# Patient Record
Sex: FEMALE | Race: BLACK OR AFRICAN AMERICAN
[De-identification: names, ages, dates, MRNs, and addresses within clinical notes are randomized per-mention and may not be internally consistent; named-entity substitution may affect disease eponyms.]

---

## 2017-05-05 ENCOUNTER — HOSPITAL ENCOUNTER (OUTPATIENT)
Dept: HOSPITAL 47 - RADUSMAIN | Age: 27
Discharge: HOME | End: 2017-05-05
Payer: COMMERCIAL

## 2017-05-05 DIAGNOSIS — N83.201: Primary | ICD-10-CM

## 2017-05-05 PROCEDURE — 76856 US EXAM PELVIC COMPLETE: CPT

## 2017-05-06 NOTE — US
EXAMINATION TYPE: US pelvic complete

 

DATE OF EXAM: 5/5/2017 6:12 PM

 

COMPARISON: NONE

 

CLINICAL HISTORY: N92.0 Menorrhagia.

 

TECHNIQUE:  Transabdominal (TA)

 

Date of LMP:  4/26/17

 

EXAM MEASUREMENTS:

 

Uterus:  7.2 x 3.1 x 4.8 cm

Endometrial Stripe: 0.6 cm

Right Ovary:  2.3 x 1.6 x 1.4 cm

Left Ovary:  4.5 x 3.2 x 3.2 cm

 

 

 

1. Uterus:  Anteverted   wnl

2. Endometrium:  wnl

3. Right Ovary:  wnl

4. Left Ovary:  simple appearing cyst measuring 3.9 x 2.7 x 2.9cm

5. Bilateral Adnexa:  wnl

6. Posterior cul-de-sac:  wnl

 

 

 

IMPRESSION: Left ovarian cyst, consider follow-up

## 2020-07-14 ENCOUNTER — HOSPITAL ENCOUNTER (EMERGENCY)
Dept: HOSPITAL 47 - EC | Age: 30
Discharge: HOME | End: 2020-07-14
Payer: COMMERCIAL

## 2020-07-14 VITALS — DIASTOLIC BLOOD PRESSURE: 72 MMHG | SYSTOLIC BLOOD PRESSURE: 124 MMHG | HEART RATE: 81 BPM

## 2020-07-14 VITALS — RESPIRATION RATE: 16 BRPM | TEMPERATURE: 98 F

## 2020-07-14 DIAGNOSIS — O99.89: Primary | ICD-10-CM

## 2020-07-14 DIAGNOSIS — Z3A.01: ICD-10-CM

## 2020-07-14 DIAGNOSIS — R10.30: ICD-10-CM

## 2020-07-14 DIAGNOSIS — Z53.29: ICD-10-CM

## 2020-07-14 LAB
ALBUMIN SERPL-MCNC: 3.7 G/DL (ref 3.5–5)
ALP SERPL-CCNC: 67 U/L (ref 38–126)
ALT SERPL-CCNC: 10 U/L (ref 4–34)
ANION GAP SERPL CALC-SCNC: 6 MMOL/L
AST SERPL-CCNC: 20 U/L (ref 14–36)
BASOPHILS # BLD AUTO: 0 K/UL (ref 0–0.2)
BASOPHILS NFR BLD AUTO: 0 %
BUN SERPL-SCNC: 11 MG/DL (ref 7–17)
CALCIUM SPEC-MCNC: 9.2 MG/DL (ref 8.4–10.2)
CHLORIDE SERPL-SCNC: 105 MMOL/L (ref 98–107)
CO2 SERPL-SCNC: 24 MMOL/L (ref 22–30)
EOSINOPHIL # BLD AUTO: 0.1 K/UL (ref 0–0.7)
EOSINOPHIL NFR BLD AUTO: 2 %
ERYTHROCYTE [DISTWIDTH] IN BLOOD BY AUTOMATED COUNT: 3.44 M/UL (ref 3.8–5.4)
ERYTHROCYTE [DISTWIDTH] IN BLOOD: 12.7 % (ref 11.5–15.5)
GLUCOSE SERPL-MCNC: 74 MG/DL (ref 74–99)
HCG SERPL-MCNC: (no result) MIU/ML
HCT VFR BLD AUTO: 33.7 % (ref 34–46)
HGB BLD-MCNC: 11.6 GM/DL (ref 11.4–16)
LYMPHOCYTES # SPEC AUTO: 1.3 K/UL (ref 1–4.8)
LYMPHOCYTES NFR SPEC AUTO: 23 %
MCH RBC QN AUTO: 33.6 PG (ref 25–35)
MCHC RBC AUTO-ENTMCNC: 34.2 G/DL (ref 31–37)
MCV RBC AUTO: 98.1 FL (ref 80–100)
MONOCYTES # BLD AUTO: 0.3 K/UL (ref 0–1)
MONOCYTES NFR BLD AUTO: 4 %
NEUTROPHILS # BLD AUTO: 3.9 K/UL (ref 1.3–7.7)
NEUTROPHILS NFR BLD AUTO: 69 %
PH UR: 7.5 [PH] (ref 5–8)
PLATELET # BLD AUTO: 198 K/UL (ref 150–450)
POTASSIUM SERPL-SCNC: 3.9 MMOL/L (ref 3.5–5.1)
PROT SERPL-MCNC: 6.4 G/DL (ref 6.3–8.2)
RBC UR QL: 3 /HPF (ref 0–5)
SODIUM SERPL-SCNC: 135 MMOL/L (ref 137–145)
SP GR UR: 1.03 (ref 1–1.03)
SQUAMOUS UR QL AUTO: <1 /HPF (ref 0–4)
UROBILINOGEN UR QL STRIP: 4 MG/DL (ref ?–2)
WBC # BLD AUTO: 5.7 K/UL (ref 3.8–10.6)
WBC # UR AUTO: 14 /HPF (ref 0–5)

## 2020-07-14 PROCEDURE — 80053 COMPREHEN METABOLIC PANEL: CPT

## 2020-07-14 PROCEDURE — 76801 OB US < 14 WKS SINGLE FETUS: CPT

## 2020-07-14 PROCEDURE — 76817 TRANSVAGINAL US OBSTETRIC: CPT

## 2020-07-14 PROCEDURE — 87086 URINE CULTURE/COLONY COUNT: CPT

## 2020-07-14 PROCEDURE — 84702 CHORIONIC GONADOTROPIN TEST: CPT

## 2020-07-14 PROCEDURE — 81001 URINALYSIS AUTO W/SCOPE: CPT

## 2020-07-14 PROCEDURE — 85025 COMPLETE CBC W/AUTO DIFF WBC: CPT

## 2020-07-14 PROCEDURE — 81025 URINE PREGNANCY TEST: CPT

## 2020-07-14 PROCEDURE — 99284 EMERGENCY DEPT VISIT MOD MDM: CPT

## 2020-07-14 PROCEDURE — 36415 COLL VENOUS BLD VENIPUNCTURE: CPT

## 2020-07-14 NOTE — US
EXAMINATION TYPE: Transabdominal

 

DATE OF EXAM: 2020 9:15 PM

 

COMPARISON: US 2017

 

CLINICAL HISTORY: preg, lower abdominal discomfort. Pregnant, lower abdominal discomfort. LMP unknown
. .

 

EXAM PERFORMED:  Transvaginal (TV) and Transabdominal (TA)

 

EXAM MEASUREMENTS:

 

GESTATIONAL AGE / DATING

 

Physician Established: Not yet established 

Dates by LMP:  Unknown 

Dates by First Scan:  This is first scan 

Dates by Current Scan for: (5 weeks/3 days)  

** EDC: 2020. Gestational sac and yolk sac seen at this time.

 

MATERNAL ANATOMY 

 

Uterus: 7.9 x 6.5 x 4.5 cm.

Right Ovary: 4.1 x 2.0 x 2.9 cm. Measures enlarged. Anechoic area seen: 1.6 x 1.2 x 1.5 cm. Additiona
l mixed area seen as mentioned below. 

Left Ovary: not seen

Post CDS / Adnexa: Fluid seen in CDS

Presence of free fluid: Fluid seen in CDS

Presence of corpus luteal cyst: Area of mixed echogenicity and peripheral vascularity seen within rig
ht ovary: 2.0 x 1.6 x 1.8 cm. 

Presence of subchorionic bleed: not seen

 

GESTATION / FETAL SURVEY

 

 

MSD: 1.24 cm. (5 weeks/3 days)

Yolk Sac (normal less than 6mm): 1.9 mm

IUP:  Gestational sac and yolk sac seen at this time.

 

Date of LMP: unknown

Beta HcG (if available):  detected

 

IMPRESSION:

 

1. Single intrauterine gestation estimated at 5 weeks 3 days gestation based on the mean sac diameter
. Fetal pole is not identified. Yolk sac is present. No cardiac activity at this time. Follow-up is r
ecommended.

## 2020-07-14 NOTE — ED
Abdominal Pain HPI





- General


Chief Complaint: Abdominal Pain


Stated Complaint: Abd Pain


Time Seen by Provider: 20 19:39


Source: patient


Mode of arrival: ambulatory


Limitations: no limitations





- History of Present Illness


Initial Comments: 


29-year-old  A1 presenting to the emergency department today for chief 

complaint of lower abdominal cramping.  Patient states that she had positive 

pregnancy test one week ago.  She states her last period was in the beginning of

 inches unsure of the exact date.  Patient denies any vaginal bleeding va

ginal discharge dysuria urgency frequency.  She states she developed cramping 

yesterday.  Patient states that she follows Dr. Merino for OBGYN care. Patient 

states that she was concerned that something was wrong with the pregnancy and 

presented to the ER for evaluation. patient has no additional complaints.








- Related Data


                                Home Medications











 Medication  Instructions  Recorded  Confirmed


 


Hydrocortisone Oint 1 applic TOPICAL BID PRN 20





[Hydrocortisone 2.5% Oint]   











                                    Allergies











Allergy/AdvReac Type Severity Reaction Status Date / Time


 


No Known Allergies Allergy   Verified 20 20:42














Review of Systems


ROS Statement: 


Those systems with pertinent positive or pertinent negative responses have been 

documented in the HPI.





ROS Other: All systems not noted in ROS Statement are negative.





Past Medical History


Past Medical History: No Reported History


History of Any Multi-Drug Resistant Organisms: None Reported


Past Surgical History: No Surgical Hx Reported


Past Anesthesia/Blood Transfusion Reactions: No Reported Reaction


Past Psychological History: No Psychological Hx Reported


Smoking Status: Never smoker


Past Alcohol Use History: None Reported


Past Drug Use History: None Reported





- Past Family History


  ** Mother


Family Medical History: Hypertension





General Exam





- General Exam Comments


Initial Comments: 


General:  The patient is awake and alert, in no distress


Eye:  +3 mm pupils are equal, round and reactive to light, extra-ocular 

movements are intact.  No nystagmus.  There is normal conjunctiva bilaterally.  

No signs of icterus.  


Cardiovascular:  There is a regular rate and rhythm. No murmur, rub or gallop is

appreciated.


Respiratory:  Lungs are clear to auscultation, respirations are non-labored, 

breath sounds are equal.  No wheezes, stridor, rales, or rhonchi.


Gastrointestinal:  Soft, non-distended, non-tender abdomen without masses or 

organomegaly noted. There is no rebound or guarding present.  


Musculoskeletal:  Normal ROM, no tenderness.  Strength 5/5. Sensation intact. 

Radial pulses equal bilaterally 2+.  


Neurological:  A&O x 3. CN II-XII intact grossly, There are no obvious motor or 

sensory deficits. Coordination appears grossly intact. Speech is normal.


Skin:  Skin is warm and dry and no rashes or lesions are noted. 


Psychiatric:  Cooperative, appropriate mood & affect, normal judgment.  





Limitations: no limitations





Course


                                   Vital Signs











  20





  19:33 22:27


 


Temperature 98.0 F 


 


Pulse Rate 62 81


 


Respiratory 16 16





Rate  


 


Blood Pressure 103/71 124/72


 


O2 Sat by Pulse 100 99





Oximetry  














Medical Decision Making





- Medical Decision Making


29F feel presented for chief complaint of lower abdominal cramping.  In 

pregnancy.  Last period last month.  There is IUP on US measuring 5wks 3 days. 

Patient denies vaginal bleeding. Refused vaginal examination. Patient is 

agreeable to care plan and discharge. I recommended f/u in office with Dr. Merino. Discussed case wtih Dr. Kahn.








- Lab Data


Result diagrams: 


                                 20 20:13





                                 20 20:13


                                   Lab Results











  20 Range/Units





  19:44 19:53 20:13 


 


WBC     (3.8-10.6)  k/uL


 


RBC     (3.80-5.40)  m/uL


 


Hgb     (11.4-16.0)  gm/dL


 


Hct     (34.0-46.0)  %


 


MCV     (80.0-100.0)  fL


 


MCH     (25.0-35.0)  pg


 


MCHC     (31.0-37.0)  g/dL


 


RDW     (11.5-15.5)  %


 


Plt Count     (150-450)  k/uL


 


Neutrophils %     %


 


Lymphocytes %     %


 


Monocytes %     %


 


Eosinophils %     %


 


Basophils %     %


 


Neutrophils #     (1.3-7.7)  k/uL


 


Lymphocytes #     (1.0-4.8)  k/uL


 


Monocytes #     (0-1.0)  k/uL


 


Eosinophils #     (0-0.7)  k/uL


 


Basophils #     (0-0.2)  k/uL


 


Sodium    135 L  (137-145)  mmol/L


 


Potassium    3.9  (3.5-5.1)  mmol/L


 


Chloride    105  ()  mmol/L


 


Carbon Dioxide    24  (22-30)  mmol/L


 


Anion Gap    6  mmol/L


 


BUN    11  (7-17)  mg/dL


 


Creatinine    0.55  (0.52-1.04)  mg/dL


 


Est GFR (CKD-EPI)AfAm    >90  (>60 ml/min/1.73 sqM)  


 


Est GFR (CKD-EPI)NonAf    >90  (>60 ml/min/1.73 sqM)  


 


Glucose    74  (74-99)  mg/dL


 


Calcium    9.2  (8.4-10.2)  mg/dL


 


Total Bilirubin    0.3  (0.2-1.3)  mg/dL


 


AST    20  (14-36)  U/L


 


ALT    10  (4-34)  U/L


 


Alkaline Phosphatase    67  ()  U/L


 


Total Protein    6.4  (6.3-8.2)  g/dL


 


Albumin    3.7  (3.5-5.0)  g/dL


 


HCG, Quant    79114.8  mIU/mL


 


Urine Color  Yellow    


 


Urine Appearance  Clear    (Clear)  


 


Urine pH  7.5    (5.0-8.0)  


 


Ur Specific Gravity  1.026    (1.001-1.035)  


 


Urine Protein  Negative    (Negative)  


 


Urine Glucose (UA)  Negative    (Negative)  


 


Urine Ketones  Negative    (Negative)  


 


Urine Blood  Negative    (Negative)  


 


Urine Nitrite  Negative    (Negative)  


 


Urine Bilirubin  Negative    (Negative)  


 


Urine Urobilinogen  4.0    (<2.0)  mg/dL


 


Ur Leukocyte Esterase  Small H    (Negative)  


 


Urine RBC  3    (0-5)  /hpf


 


Urine WBC  14 H    (0-5)  /hpf


 


Ur Squamous Epith Cells  <1    (0-4)  /hpf


 


Urine Bacteria  Rare H    (None)  /hpf


 


Urine Mucus  Few H    (None)  /hpf


 


Urine HCG, Qual   Detected   (Not Detectd)  














  20 Range/Units





  20:13 


 


WBC  5.7  (3.8-10.6)  k/uL


 


RBC  3.44 L  (3.80-5.40)  m/uL


 


Hgb  11.6  (11.4-16.0)  gm/dL


 


Hct  33.7 L  (34.0-46.0)  %


 


MCV  98.1  (80.0-100.0)  fL


 


MCH  33.6  (25.0-35.0)  pg


 


MCHC  34.2  (31.0-37.0)  g/dL


 


RDW  12.7  (11.5-15.5)  %


 


Plt Count  198  (150-450)  k/uL


 


Neutrophils %  69  %


 


Lymphocytes %  23  %


 


Monocytes %  4  %


 


Eosinophils %  2  %


 


Basophils %  0  %


 


Neutrophils #  3.9  (1.3-7.7)  k/uL


 


Lymphocytes #  1.3  (1.0-4.8)  k/uL


 


Monocytes #  0.3  (0-1.0)  k/uL


 


Eosinophils #  0.1  (0-0.7)  k/uL


 


Basophils #  0.0  (0-0.2)  k/uL


 


Sodium   (137-145)  mmol/L


 


Potassium   (3.5-5.1)  mmol/L


 


Chloride   ()  mmol/L


 


Carbon Dioxide   (22-30)  mmol/L


 


Anion Gap   mmol/L


 


BUN   (7-17)  mg/dL


 


Creatinine   (0.52-1.04)  mg/dL


 


Est GFR (CKD-EPI)AfAm   (>60 ml/min/1.73 sqM)  


 


Est GFR (CKD-EPI)NonAf   (>60 ml/min/1.73 sqM)  


 


Glucose   (74-99)  mg/dL


 


Calcium   (8.4-10.2)  mg/dL


 


Total Bilirubin   (0.2-1.3)  mg/dL


 


AST   (14-36)  U/L


 


ALT   (4-34)  U/L


 


Alkaline Phosphatase   ()  U/L


 


Total Protein   (6.3-8.2)  g/dL


 


Albumin   (3.5-5.0)  g/dL


 


HCG, Quant   mIU/mL


 


Urine Color   


 


Urine Appearance   (Clear)  


 


Urine pH   (5.0-8.0)  


 


Ur Specific Gravity   (1.001-1.035)  


 


Urine Protein   (Negative)  


 


Urine Glucose (UA)   (Negative)  


 


Urine Ketones   (Negative)  


 


Urine Blood   (Negative)  


 


Urine Nitrite   (Negative)  


 


Urine Bilirubin   (Negative)  


 


Urine Urobilinogen   (<2.0)  mg/dL


 


Ur Leukocyte Esterase   (Negative)  


 


Urine RBC   (0-5)  /hpf


 


Urine WBC   (0-5)  /hpf


 


Ur Squamous Epith Cells   (0-4)  /hpf


 


Urine Bacteria   (None)  /hpf


 


Urine Mucus   (None)  /hpf


 


Urine HCG, Qual   (Not Detectd)  














Disposition


Clinical Impression: 


 Early stage of pregnancy, Intrauterine pregnancy





Disposition: HOME SELF-CARE


Condition: Good


Instructions (If sedation given, give patient instructions):  Abdominal Pain in 

Pregnancy (ED)


Additional Instructions: 


Please use medication as discussed.  Please follow-up with Dr. Sandhu in next 

week.  Please return to emergency room if the symptoms increase or worsen or for

any other concerns.


Is patient prescribed a controlled substance at d/c from ED?: No


Referrals: 


Virginia Hammond MD [Primary Care Provider] - 1-2 days


Yun Sandhu DO [Doctor of Osteopathic Medicine] - 1-2 days


Time of Disposition: 22:13

## 2020-08-10 ENCOUNTER — HOSPITAL ENCOUNTER (OUTPATIENT)
Dept: HOSPITAL 47 - RADUSWWP | Age: 30
Discharge: HOME | End: 2020-08-10
Attending: OBSTETRICS & GYNECOLOGY
Payer: MEDICARE

## 2020-08-10 DIAGNOSIS — Z3A.09: ICD-10-CM

## 2020-08-10 DIAGNOSIS — Z36.9: Primary | ICD-10-CM

## 2020-08-10 PROCEDURE — 76801 OB US < 14 WKS SINGLE FETUS: CPT

## 2020-08-10 NOTE — US
EXAMINATION TYPE: Transabdominal

 

DATE OF EXAM: 8/10/2020 9:00 AM

 

COMPARISON: Ultrasound 2020

 

CLINICAL HISTORY: Z36 CONFIRM DATES. Dates

 

EXAM PERFORMED:  Transabdominal (TA)

 

EXAM MEASUREMENTS:

 

GESTATIONAL AGE / DATING

 

Dates by LMP: (9 weeks/2 days)  

** EDC: 3/13/2020

Dates by First Scan:  Unable to date by first ultrasound 

Dates by Current Scan for: (9 weeks/3 days)  

** EDC: 3/12/2020

 

MATERNAL ANATOMY 

 

Uterus: 10.0 x 7.4 x 5.6 cm 

Right Ovary: 4.1 x 2.1 x 2.6 cm

Left Ovary: 3.8 x 1.8 x 1.7 cm

Post CDS / Adnexa: no free fluid

Presence of free fluid: no 

Presence of corpus luteal cyst: right hypoechoic lesion with peripheral vascular flow = 1.9 x 2.2 x 1
.8 cm 

Presence of subchorionic bleed: no

 

GESTATION / FETAL SURVEY

 

CRL: 2.7 cm (9 weeks/3 days)

MSD: seen, not measured 

Yolk Sac (normal less than 6mm): 2.8 mm

Heart Rate: 171 bpm

Rhythm:  Normal

IUP:  Viable IUP

 

 

Date of LMP: 2020, 

Beta HcG (if available): Not available at this time

 

Single live IUP measuring 9 weeks 3 days

 

Single live intrauterine gestation is now identified. The gestational sac, yolk sac, now fetal pole a
re seen on today's study. Fetal heart tones regular and within normal limits. No free fluid in pelvic
 cul-de-sac.

 

Both ovaries redemonstrated. Persistent corpus luteal cyst suspected in the right ovary. No concernin
g ovarian adnexal masses.

 

IMPRESSION: There is now confirmation of single live intrauterine gestation with satisfactory interva
l progression from prior ultrasound, mean crown-rump length is 2.7 cm corresponding to a 9 week 3 day
 old fetus.

## 2021-02-28 ENCOUNTER — HOSPITAL ENCOUNTER (OUTPATIENT)
Age: 31
LOS: 1 days | Discharge: HOME | End: 2021-03-01
Payer: MEDICARE

## 2021-02-28 PROCEDURE — 84112 EVAL AMNIOTIC FLUID PROTEIN: CPT

## 2021-02-28 PROCEDURE — 99213 OFFICE O/P EST LOW 20 MIN: CPT

## 2021-02-28 PROCEDURE — 59025 FETAL NON-STRESS TEST: CPT

## 2021-03-01 NOTE — P.MSEPDOC
Presenting Problems





- Arrival Data


Date of Arrival on Unit: 21


Time of Arrival on Unit: 23:30


Mode of Transport: Wheelchair





- Complaint


OB-Reason for Admission/Chief Complaint: Possible Onset of Labor, Rule Out SROM


Comment: Pt presents to triage with c/o contractions starting around 1900 and 

possible leaking of fluid which started at 2100. Pt states she is not sure if 

water broke or not. Contractions tracing every 2.5-5 minutes. Amnisure performed

which was negative.





Prenatal Medical History





- Pregnancy Information


: 3


Para: 1


Term: 1


: 0


Abortions: Spontaneous or Elective: 1


Number of Living Children: 1





- Gestational Age


Gestational Age by NIKUNJ (wks/days): 38 Weeks and 3 Days





- Prenatal History


Pregnancy Complications: Other


Comment: Pt positive for trichomonas and treated with Flagyl at 15 weeks.





Review of Systems





- Review of Systems


Constitutional: No problems


Breast: No problems


ENT: No problems


Cardiovascular: No problems


Respiratory: No problems


Gastrointestinal: No problems


Genitourinary: No problems


Musculoskeletal: No problems


Neurological: No problems


Skin: No problems





Vital Signs





- Temperature


Temperature: 96.6 F


Temperature Source: Temporal Artery Scan





- Pulse


  ** Apical


Pulse Rate: 76


Pulse Assessment Method: Automatic Cuff





- Respirations


Respiratory Rate: 18


Oxygen Delivery Method: Room Air


O2 Sat by Pulse Oximetry: 99





- Blood Pressure


  ** Right Arm


Blood Pressure: 126/80


Blood Pressure Mean: 95


Blood Pressure Source: Automatic Cuff





Medical Screen Scoring (Pre)





- Cervical Exam


Dilation: 4-7 cm = 2


Effacement: More than 50% = 2


Membranes: Intact





- Uterine Contractions


Frequency: > or = 36 weeks =2


Duration: > 40 seconds = 2


Intensity: N/A





- Maternal Vital Signs


Maternal Temperature: N/A


Maternal Blood Pressure: N/A


Signs of Preeclampsia: N/A


Maternal Respirations: N/A





- Maternal Trauma


Maternal Trauma: N/A





- Fetal Assessment - Baby A


Baseline FHR: 135


Fetal Heart Rate - NICHD Category: Category I (Normal) = 0


NST: Reactive


Fetal Position: N/A


Fetal Station: N/A





- Total Score - Baby A


Total Score - Baby A: 8





- Total Score - Baby B


Total Score - Baby B: 8





- Total Score - Baby C


Total Score - Baby C: 8





- Level of Risk - Baby A


Level of Risk - Baby A: Medium (6-9)





- Level of Risk - Baby B


Level of Risk - Baby B: Medium (6-9)





- Level of Risk - Baby C


Level of Risk - Baby C: Medium (6-9)





Physician Notification (Pre)





- Physician Notified


Physician Notified Date: 21


Physician Notified Time: 01:09


New Order Received: Yes





- Notification Comment


Comment: Dr. Merino called re: pt c/o contractions and "leaking fluid", maternal

status, pain scale 9/10, fetal status, negative amnisure result, SVE and no 

cervical change within the hour.  Orders received to d/c pt home. Pt to keep 

scheduled appt with Dr. Sandhu tomorrow.





Disposition





- Disposition


OB Disposition: Discharge to home


Discharge Date: 21


Discharge Time: 01:18


I agree with the RN Medical Screening Exam: Yes


Case reviewed; plan agreed upon as documented in EMR&OBIX.: Yes


Diagnosis: FALSE LABOR BEFORE 37 COMPLETED WEEKS OF GEST, THIRD TRI

## 2021-03-03 ENCOUNTER — HOSPITAL ENCOUNTER (INPATIENT)
Dept: HOSPITAL 47 - FBPOP | Age: 31
LOS: 1 days | Discharge: HOME | End: 2021-03-04
Attending: OBSTETRICS & GYNECOLOGY | Admitting: OBSTETRICS & GYNECOLOGY
Payer: MEDICARE

## 2021-03-03 VITALS — RESPIRATION RATE: 16 BRPM

## 2021-03-03 DIAGNOSIS — Z3A.38: ICD-10-CM

## 2021-03-03 DIAGNOSIS — Z86.19: ICD-10-CM

## 2021-03-03 DIAGNOSIS — Z82.49: ICD-10-CM

## 2021-03-03 DIAGNOSIS — Z87.891: ICD-10-CM

## 2021-03-03 LAB
ALT SERPL-CCNC: 8 U/L (ref 4–34)
AST SERPL-CCNC: 21 U/L (ref 14–36)
BASOPHILS # BLD AUTO: 0 K/UL (ref 0–0.2)
BASOPHILS NFR BLD AUTO: 0 %
EOSINOPHIL # BLD AUTO: 0 K/UL (ref 0–0.7)
EOSINOPHIL NFR BLD AUTO: 1 %
ERYTHROCYTE [DISTWIDTH] IN BLOOD BY AUTOMATED COUNT: 3.27 M/UL (ref 3.8–5.4)
ERYTHROCYTE [DISTWIDTH] IN BLOOD: 13.6 % (ref 11.5–15.5)
HCT VFR BLD AUTO: 30 % (ref 34–46)
HGB BLD-MCNC: 10 GM/DL (ref 11.4–16)
LDH SPEC-CCNC: 422 U/L (ref 313–618)
LYMPHOCYTES # SPEC AUTO: 1.2 K/UL (ref 1–4.8)
LYMPHOCYTES NFR SPEC AUTO: 23 %
MCH RBC QN AUTO: 30.7 PG (ref 25–35)
MCHC RBC AUTO-ENTMCNC: 33.5 G/DL (ref 31–37)
MCV RBC AUTO: 91.7 FL (ref 80–100)
MONOCYTES # BLD AUTO: 0.3 K/UL (ref 0–1)
MONOCYTES NFR BLD AUTO: 5 %
NEUTROPHILS # BLD AUTO: 3.7 K/UL (ref 1.3–7.7)
NEUTROPHILS NFR BLD AUTO: 68 %
PH UR: 7 [PH] (ref 5–8)
PLATELET # BLD AUTO: 175 K/UL (ref 150–450)
SP GR UR: 1.02 (ref 1–1.03)
URATE SERPL-MCNC: 3.2 MG/DL (ref 3.7–7.4)
UROBILINOGEN UR QL STRIP: <2 MG/DL (ref ?–2)
WBC # BLD AUTO: 5.4 K/UL (ref 3.8–10.6)

## 2021-03-03 PROCEDURE — 83615 LACTATE (LD) (LDH) ENZYME: CPT

## 2021-03-03 PROCEDURE — 3E0R3BZ INTRODUCTION OF ANESTHETIC AGENT INTO SPINAL CANAL, PERCUTANEOUS APPROACH: ICD-10-PCS

## 2021-03-03 PROCEDURE — 85025 COMPLETE CBC W/AUTO DIFF WBC: CPT

## 2021-03-03 PROCEDURE — 86901 BLOOD TYPING SEROLOGIC RH(D): CPT

## 2021-03-03 PROCEDURE — 0KQM0ZZ REPAIR PERINEUM MUSCLE, OPEN APPROACH: ICD-10-PCS

## 2021-03-03 PROCEDURE — 84550 ASSAY OF BLOOD/URIC ACID: CPT

## 2021-03-03 PROCEDURE — 82565 ASSAY OF CREATININE: CPT

## 2021-03-03 PROCEDURE — 81003 URINALYSIS AUTO W/O SCOPE: CPT

## 2021-03-03 PROCEDURE — 00HU33Z INSERTION OF INFUSION DEVICE INTO SPINAL CANAL, PERCUTANEOUS APPROACH: ICD-10-PCS

## 2021-03-03 PROCEDURE — 84520 ASSAY OF UREA NITROGEN: CPT

## 2021-03-03 PROCEDURE — 84112 EVAL AMNIOTIC FLUID PROTEIN: CPT

## 2021-03-03 PROCEDURE — 86850 RBC ANTIBODY SCREEN: CPT

## 2021-03-03 PROCEDURE — 86900 BLOOD TYPING SEROLOGIC ABO: CPT

## 2021-03-03 PROCEDURE — 84450 TRANSFERASE (AST) (SGOT): CPT

## 2021-03-03 PROCEDURE — 59025 FETAL NON-STRESS TEST: CPT

## 2021-03-03 PROCEDURE — 99213 OFFICE O/P EST LOW 20 MIN: CPT

## 2021-03-03 PROCEDURE — 84460 ALANINE AMINO (ALT) (SGPT): CPT

## 2021-03-03 RX ADMIN — IBUPROFEN PRN MG: 600 TABLET ORAL at 14:00

## 2021-03-03 RX ADMIN — IBUPROFEN PRN MG: 600 TABLET ORAL at 21:23

## 2021-03-03 RX ADMIN — POTASSIUM CHLORIDE SCH MLS/HR: 14.9 INJECTION, SOLUTION INTRAVENOUS at 01:38

## 2021-03-03 RX ADMIN — POTASSIUM CHLORIDE SCH MLS/HR: 14.9 INJECTION, SOLUTION INTRAVENOUS at 08:51

## 2021-03-03 RX ADMIN — AMPICILLIN SODIUM SCH MLS/HR: 1 INJECTION, POWDER, FOR SOLUTION INTRAMUSCULAR; INTRAVENOUS at 09:44

## 2021-03-03 RX ADMIN — ACETAMINOPHEN PRN MG: 325 TABLET, FILM COATED ORAL at 15:30

## 2021-03-03 RX ADMIN — AMPICILLIN SODIUM SCH MLS/HR: 1 INJECTION, POWDER, FOR SOLUTION INTRAMUSCULAR; INTRAVENOUS at 05:46

## 2021-03-03 RX ADMIN — DOCUSATE SODIUM AND SENNOSIDES SCH: 50; 8.6 TABLET ORAL at 21:24

## 2021-03-03 NOTE — P.PROBDLV
Vaginal Delivery Note





- .


Vaginal Delivery Note: 





The patient progressed to complete dilation after oxytocin augmentation of labor

and artificial rupture of membranes of a 4 bag with clear fluid noted.  She did 

receive epidural anesthesia.  Once reaching complete, she began pushing.  After 

approximately 50 minutes, the infant's head came to a crown.  She was noted to 

have variable decelerations down to the 60s with good return to baseline after 

her contractions.  With one further push, the infant's head delivered across the

perineum and a right occiput posterior lie followed by the anterior shoulder.  A

tight nuchal cord 2 was doubly clamped and cut and reduced around the infant's.

 With one remaining push the remainder the infant easily delivered and was 

placed on mother's abdomen.  Infant was immediately taken to warmer for 

evaluation by nursing staff.  A viable female infant was noted with Apgar scores

of 2 at 1 minute 6 at 5 minutes and 7 at 10 minutes.  The placenta delivered 

shortly thereafter, intact, with a three-vessel cord.  Uterus contracted fairly 

well after oxytocin was given and uterine massage was carried out.  Her bladder 

was also drained with a catheter to facilitate uterine contraction.  Inspection 

of the perineum revealed a second-degree perineal laceration.  This was 

anesthetized with 1% lidocaine and sutured with 3-0 and 2-0 Vicryl suture in the

usual multilayer fashion.  Estimated blood loss is approximately 200 mL's.  

Mother is in stable condition and infant is taken to nursery for further 

evaluation. DISPLAY PLAN FREE TEXT

## 2021-03-03 NOTE — P.HPOB
History of Present Illness


H&P Date: 21


Chief Complaint: Spontaneous rupture membranes





This is a 30-year-old female  3 para 1 at 38-4/7 weeks who presented with

spontaneous rupture of membranes at approximately 12:30 AM.  She has been 

feeling contractions since.  Prenatal course has been essentially uncomplicated.

 She was treated for trichomonas earlier in the pregnancy.  She has used 

marijuana during the pregnancy but was advised to stop using it.





Prenatal labs:


Hepatitis B surface antigen-negative


RPR-nonreactive


Rubella-immune


Blood type-O+


Antibody screen-negative


HIV-nonreactive


Hemoglobin-11


Random glucose-73


Obstetrical ultrasound-normal anatomy


One hour Glucola-1:15


Group B streptococcus-positive


GC chlamydia-negative


Trichomonas-positive but test of cure negative.





Obstetrical history: .  History of 1 vaginal delivery at 35-1/2 weeks with 

 labor.  History of 1 miscarriage.


Gynecologic history: No history of sexually transmitted diseases.  History of 

Trichomonas.


Social history: She is single.  She is unemployed.





Review of Systems


Constitutional: Denies chills, Denies fever


Eyes: denies blurred vision, denies pain


Ears, nose, mouth and throat: Denies headache, Denies sore throat


Cardiovascular: Denies chest pain, Denies shortness of breath


Respiratory: Denies cough


Gastrointestinal: Reports abdominal pain (Contractions)


Genitourinary: Reports pelvic pain, Reports pregnant


Musculoskeletal: Reports low back pain


Integumentary: Denies pruritus, Denies rash


Neurological: Denies numbness, Denies weakness


Psychiatric: Denies anxiety, Denies depression





Past Medical History


Past Medical History: No Reported History


History of Any Multi-Drug Resistant Organisms: None Reported


Past Surgical History: No Surgical Hx Reported


Past Anesthesia/Blood Transfusion Reactions: No Reported Reaction


Past Psychological History: No Psychological Hx Reported


Smoking Status: Former smoker


Past Alcohol Use History: None Reported


Past Drug Use History: Marijuana (Was advised to quit in early pregnancy.)





- Past Family History


  ** Mother


Family Medical History: Hypertension





Medications and Allergies


                                Home Medications











 Medication  Instructions  Recorded  Confirmed  Type


 


No Known Home Medications  21 History








                                    Allergies











Allergy/AdvReac Type Severity Reaction Status Date / Time


 


No Known Allergies Allergy   Verified 21 01:03














Exam


Osteopathic Statement: *.  No significant issues noted on an osteopathic 

structural exam other than those noted in the History and Physical/Consult.


                                   Vital Signs











  Temp Pulse Resp BP Pulse Ox


 


 21 01:03  97.8 F  69  16  140/87  99








                                Intake and Output











 21





 22:59 06:59 14:59


 


Other:   


 


  Weight  81.647 kg 














HEENT: Within normal limits


Heart: Regular rate and rhythm


Lungs: Clear to auscultation bilaterally


Abdomen: 


Cervix: Initially 4-5 cm/70%/-2 station.  Positive amnisure with clear fluid 

noted.


Fetal heart tones: Reactive, category 1


Contractions: Irregular approximately 5 minutes apart.


Extremities: Negative Homans.





Results


Result Diagrams: 


                                 21 01:35





                                 21 01:35


                  Abnormal Lab Results - Last 24 Hours (Table)











  21 Range/Units





  01:35 01:35 


 


RBC  3.27 L   (3.80-5.40)  m/uL


 


Hgb  10.0 L   (11.4-16.0)  gm/dL


 


Hct  30.0 L   (34.0-46.0)  %


 


Creatinine   0.46 L  (0.52-1.04)  mg/dL


 


Uric Acid   3.2 L  (3.7-7.4)  mg/dL














Assessment and Plan


(1) 38 weeks gestation of pregnancy


Current Visit: Yes   Status: Acute   Code(s): Z3A.38 - 38 WEEKS GESTATION OF 

PREGNANCY   SNOMED Code(s): 17487283


   





(2) Group B Streptococcus carrier, +RV culture, currently pregnant


Current Visit: Yes   Status: Acute   Code(s): O99.820 - STREPTOCOCCUS B CARRIER 

STATE COMPLICATING PREGNANCY   SNOMED Code(s): 4168771387375


   


Plan: 





Admission for active labor.  Epidural anesthesia.  Ampicillin for group B 

streptococcus carrier.  Expectant management.  Will consult  

after delivery for history of marijuana use.

## 2021-03-04 VITALS — SYSTOLIC BLOOD PRESSURE: 119 MMHG | TEMPERATURE: 98.1 F | DIASTOLIC BLOOD PRESSURE: 70 MMHG | HEART RATE: 74 BPM

## 2021-03-04 LAB
BASOPHILS # BLD AUTO: 0 K/UL (ref 0–0.2)
BASOPHILS NFR BLD AUTO: 0 %
EOSINOPHIL # BLD AUTO: 0.1 K/UL (ref 0–0.7)
EOSINOPHIL NFR BLD AUTO: 2 %
ERYTHROCYTE [DISTWIDTH] IN BLOOD BY AUTOMATED COUNT: 2.8 M/UL (ref 3.8–5.4)
ERYTHROCYTE [DISTWIDTH] IN BLOOD: 13.9 % (ref 11.5–15.5)
HCT VFR BLD AUTO: 25.9 % (ref 34–46)
HGB BLD-MCNC: 8.6 GM/DL (ref 11.4–16)
LYMPHOCYTES # SPEC AUTO: 1 K/UL (ref 1–4.8)
LYMPHOCYTES NFR SPEC AUTO: 17 %
MCH RBC QN AUTO: 30.7 PG (ref 25–35)
MCHC RBC AUTO-ENTMCNC: 33.1 G/DL (ref 31–37)
MCV RBC AUTO: 92.6 FL (ref 80–100)
MONOCYTES # BLD AUTO: 0.3 K/UL (ref 0–1)
MONOCYTES NFR BLD AUTO: 5 %
NEUTROPHILS # BLD AUTO: 4.3 K/UL (ref 1.3–7.7)
NEUTROPHILS NFR BLD AUTO: 74 %
PLATELET # BLD AUTO: 123 K/UL (ref 150–450)
WBC # BLD AUTO: 5.9 K/UL (ref 3.8–10.6)

## 2021-03-04 RX ADMIN — DOCUSATE SODIUM AND SENNOSIDES SCH EACH: 50; 8.6 TABLET ORAL at 07:45

## 2021-03-04 RX ADMIN — IBUPROFEN PRN MG: 600 TABLET ORAL at 07:11

## 2021-03-04 RX ADMIN — ACETAMINOPHEN PRN MG: 325 TABLET, FILM COATED ORAL at 02:30

## 2021-03-04 NOTE — P.DS
Providers


Date of admission: 


03/03/21 01:15





Expected date of discharge: 03/04/21


Attending physician: 


Yun Sandhu





Primary care physician: 


Stated None








- Discharge Diagnosis(es)


(1) Normal vaginal delivery


Current Visit: Yes   Status: Acute   


Hospital Course: 





Patient presented after spontaneous rupture of membranes.  She underwent a 

normal vaginal delivery with Pitocin augmentation.  Postpartum course was 

uncomplicated.  She'll be discharged home postpartum day #1 in stable condition 

to follow-up with Dr. Sandhu in 6 weeks.





Plan - Discharge Summary


New Discharge Prescriptions: 


New


   Ibuprofen [Motrin] 600 mg PO Q6HR PRN #30 tab


     PRN Reason: Mild Pain Or Fever >= 100.5


Discharge Medication List





Ibuprofen [Motrin] 600 mg PO Q6HR PRN #30 tab 03/04/21 [Rx]








Follow up Appointment(s)/Referral(s): 


Mari Merino DO [Doctor of Osteopathic Medicine] - 6 Weeks


Discharge Disposition: HOME SELF-CARE